# Patient Record
Sex: FEMALE | Race: OTHER | Employment: OTHER | URBAN - METROPOLITAN AREA
[De-identification: names, ages, dates, MRNs, and addresses within clinical notes are randomized per-mention and may not be internally consistent; named-entity substitution may affect disease eponyms.]

---

## 2018-08-03 ENCOUNTER — HOSPITAL ENCOUNTER (EMERGENCY)
Age: 78
Discharge: HOME | End: 2018-08-03
Payer: MEDICARE

## 2018-08-03 VITALS
DIASTOLIC BLOOD PRESSURE: 80 MMHG | BODY MASS INDEX: 22.99 KG/M2 | TEMPERATURE: 97.7 F | WEIGHT: 138 LBS | SYSTOLIC BLOOD PRESSURE: 160 MMHG | RESPIRATION RATE: 18 BRPM | HEART RATE: 84 BPM | HEIGHT: 65 IN | OXYGEN SATURATION: 99 %

## 2018-08-03 DIAGNOSIS — G89.29 CHRONIC RIGHT-SIDED LOW BACK PAIN WITH RIGHT-SIDED SCIATICA: Primary | ICD-10-CM

## 2018-08-03 DIAGNOSIS — M54.41 CHRONIC RIGHT-SIDED LOW BACK PAIN WITH RIGHT-SIDED SCIATICA: Primary | ICD-10-CM

## 2018-08-03 PROCEDURE — 99282 EMERGENCY DEPT VISIT SF MDM: CPT

## 2018-08-03 PROCEDURE — 96372 THER/PROPH/DIAG INJ SC/IM: CPT

## 2018-08-03 PROCEDURE — 6360000002 HC RX W HCPCS: Performed by: PHYSICIAN ASSISTANT

## 2018-08-03 RX ORDER — ATORVASTATIN CALCIUM 20 MG/1
20 TABLET, FILM COATED ORAL DAILY
COMMUNITY

## 2018-08-03 RX ORDER — NAPROXEN 500 MG/1
500 TABLET ORAL 2 TIMES DAILY
Qty: 14 TABLET | Refills: 0 | Status: SHIPPED | OUTPATIENT
Start: 2018-08-03 | End: 2018-08-10

## 2018-08-03 RX ORDER — METHYLPREDNISOLONE 4 MG/1
TABLET ORAL
Qty: 21 TABLET | Status: SHIPPED | OUTPATIENT
Start: 2018-08-03 | End: 2018-08-09

## 2018-08-03 RX ORDER — KETOROLAC TROMETHAMINE 30 MG/ML
60 INJECTION, SOLUTION INTRAMUSCULAR; INTRAVENOUS ONCE
Status: COMPLETED | OUTPATIENT
Start: 2018-08-03 | End: 2018-08-03

## 2018-08-03 RX ADMIN — KETOROLAC TROMETHAMINE 60 MG: 30 INJECTION, SOLUTION INTRAMUSCULAR at 13:03

## 2018-08-03 ASSESSMENT — PAIN SCALES - WONG BAKER: WONGBAKER_NUMERICALRESPONSE: 4

## 2018-08-03 ASSESSMENT — PAIN DESCRIPTION - LOCATION: LOCATION: GROIN

## 2018-08-03 ASSESSMENT — PAIN SCALES - GENERAL
PAINLEVEL_OUTOF10: 10
PAINLEVEL_OUTOF10: 5

## 2018-08-03 ASSESSMENT — PAIN DESCRIPTION - PAIN TYPE: TYPE: ACUTE PAIN

## 2018-08-03 NOTE — ED PROVIDER NOTES
[facility]     Department of Emergency Medicine   ED  Provider Note  Admit Date/RoomTime: 8/3/2018 12:14 PM  ED Room: 23/23   Chief Complaint:   Groin Pain (inter right groin pain since june, no dysuria, no n/v/d)    History of Present Illness   Source of history provided by:  patient. History/Exam Limitations: none. Samson Olivia is a 77 y.o. old female who has a past medical history of:   Past Medical History:   Diagnosis Date    Diabetes mellitus ([facility] [address]     Hyperlipidemia     presents to the emergency department by private vehicle, for Ongoing nontraumatic right-sided lower back pain radiating down her thigh and occasionally to the groin area which began early June. Since the time of the onset she's been having periodic episodes of severe pain I'll apply days which she feels relatively well. Her pain is generally controlled with over-the-counter anti-inflammatories. Her pain is worse with certain movements and straining and somewhat relieved by lying still and sleeping. She denying any bowel or bladder incontinence. She is denying any abdominal pain or urinary complaints. She is currently visiting from [place] and has plans to see her primary care doctor when she returns home in the next few weeks. ,  .  ROS   Pertinent positives and negatives are stated within HPI, all other systems reviewed and are negative. Past Surgical History:   Procedure Laterality Date    HYSTERECTOMY     Social History:  reports that she has never smoked. She does not have any smokeless tobacco history on file. She reports that she drinks alcohol. Family History: family history is not on file. Allergies: Celebrex [celecoxib]    Physical Exam           ED Triage Vitals [08/03/18 1211]   BP Temp Temp src Pulse Resp SpO2 Height Weight   (!) 172/78 97.7 °F (36.5 °C) -- 107 16 99 % 5' 5\" (1.651 m) 138 lb (62.6 kg)      Oxygen Saturation Interpretation: .     Constitutional:  Alert, development consistent with Dose norberto should not impact her sugars greatly but she was counseled to check her sugar. Counseling: The emergency provider has spoken with the patient and discussed todays results, in addition to providing specific details for the plan of care and counseling regarding the diagnosis and prognosis. Questions are answered at this time and they are agreeable with the plan. Assessment      1. Chronic right-sided low back pain with right-sided sciatica      Plan   Discharge to home  Patient condition is good    New Medications     New Prescriptions    METHYLPREDNISOLONE (MEDROL, NORBERTO,) 4 MG TABLET    Take as directed on package insert days 1-6    NAPROXEN (NAPROSYN) 500 MG TABLET    Take 1 tablet by mouth 2 times daily for 7 days     Electronically signed by ANNIE Huizar   DD: 8/3/18  **This report was transcribed using voice recognition software. Every effort was made to ensure accuracy; however, inadvertent computerized transcription errors may be present.   END OF ED PROVIDER NOTE       ANNIE Garcia  08/03/18 [id number]